# Patient Record
Sex: MALE | URBAN - METROPOLITAN AREA
[De-identification: names, ages, dates, MRNs, and addresses within clinical notes are randomized per-mention and may not be internally consistent; named-entity substitution may affect disease eponyms.]

---
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
## 2024-03-29 NOTE — PROCEDURE: MIPS QUALITY
No protocol for requested medication.    Medication: cyclobenzaprine (FLEXERIL) 5 MG tablet   Last office visit date: 3/06/2024  Pharmacy: [facility] #[id number] - [address]    Order pended, routed to clinician for review.     
Detail Level: Generalized
Quality 110: Preventive Care And Screening: Influenza Immunization: Influenza Immunization not Administered because Patient Refused.